# Patient Record
Sex: MALE | Race: BLACK OR AFRICAN AMERICAN | Employment: OTHER | URBAN - METROPOLITAN AREA
[De-identification: names, ages, dates, MRNs, and addresses within clinical notes are randomized per-mention and may not be internally consistent; named-entity substitution may affect disease eponyms.]

---

## 2023-08-04 ENCOUNTER — OFFICE VISIT (OUTPATIENT)
Dept: FAMILY MEDICINE CLINIC | Facility: CLINIC | Age: 63
End: 2023-08-04
Payer: MEDICAID

## 2023-08-04 VITALS
SYSTOLIC BLOOD PRESSURE: 130 MMHG | BODY MASS INDEX: 26.07 KG/M2 | DIASTOLIC BLOOD PRESSURE: 68 MMHG | RESPIRATION RATE: 16 BRPM | HEART RATE: 78 BPM | OXYGEN SATURATION: 99 % | HEIGHT: 68 IN | WEIGHT: 172 LBS

## 2023-08-04 DIAGNOSIS — G89.29 CHRONIC PAIN OF BOTH SHOULDERS: ICD-10-CM

## 2023-08-04 DIAGNOSIS — Z00.00 ANNUAL PHYSICAL EXAM: Primary | ICD-10-CM

## 2023-08-04 DIAGNOSIS — I10 PRIMARY HYPERTENSION: ICD-10-CM

## 2023-08-04 DIAGNOSIS — M25.511 CHRONIC PAIN OF BOTH SHOULDERS: ICD-10-CM

## 2023-08-04 DIAGNOSIS — R07.89 ATYPICAL CHEST PAIN: ICD-10-CM

## 2023-08-04 DIAGNOSIS — M25.512 CHRONIC PAIN OF BOTH SHOULDERS: ICD-10-CM

## 2023-08-04 RX ORDER — HYDROCHLOROTHIAZIDE 25 MG/1
TABLET ORAL
COMMUNITY
End: 2023-08-04

## 2023-08-04 RX ORDER — LISINOPRIL AND HYDROCHLOROTHIAZIDE 12.5; 1 MG/1; MG/1
1 TABLET ORAL DAILY
Qty: 90 TABLET | Refills: 3 | Status: SHIPPED | OUTPATIENT
Start: 2023-08-04 | End: 2024-07-29

## 2023-08-12 ENCOUNTER — LABORATORY ENCOUNTER (OUTPATIENT)
Dept: LAB | Age: 63
End: 2023-08-12
Attending: FAMILY MEDICINE
Payer: MEDICAID

## 2023-08-12 ENCOUNTER — EKG ENCOUNTER (OUTPATIENT)
Dept: LAB | Age: 63
End: 2023-08-12
Attending: FAMILY MEDICINE
Payer: MEDICAID

## 2023-08-12 ENCOUNTER — HOSPITAL ENCOUNTER (OUTPATIENT)
Dept: GENERAL RADIOLOGY | Age: 63
Discharge: HOME | End: 2023-08-12
Attending: FAMILY MEDICINE
Payer: MEDICAID

## 2023-08-12 DIAGNOSIS — R07.89 ATYPICAL CHEST PAIN: ICD-10-CM

## 2023-08-12 DIAGNOSIS — Z00.00 ANNUAL PHYSICAL EXAM: ICD-10-CM

## 2023-08-12 LAB
ALBUMIN SERPL-MCNC: 4.2 G/DL (ref 3.4–5)
ALBUMIN/GLOB SERPL: 1.2 {RATIO} (ref 1–2)
ALP LIVER SERPL-CCNC: 68 U/L
ALT SERPL-CCNC: 25 U/L
ANION GAP SERPL CALC-SCNC: 6 MMOL/L (ref 0–18)
AST SERPL-CCNC: 26 U/L (ref 15–37)
BASOPHILS # BLD AUTO: 0.05 X10(3) UL (ref 0–0.2)
BASOPHILS NFR BLD AUTO: 0.8 %
BILIRUB SERPL-MCNC: 1.1 MG/DL (ref 0.1–2)
BUN BLD-MCNC: 18 MG/DL (ref 7–18)
CALCIUM BLD-MCNC: 9.7 MG/DL (ref 8.5–10.1)
CHLORIDE SERPL-SCNC: 99 MMOL/L (ref 98–112)
CHOLEST SERPL-MCNC: 167 MG/DL (ref ?–200)
CO2 SERPL-SCNC: 29 MMOL/L (ref 21–32)
COMPLEXED PSA SERPL-MCNC: 3.4 NG/ML (ref ?–4)
CREAT BLD-MCNC: 1.21 MG/DL
EGFRCR SERPLBLD CKD-EPI 2021: 67 ML/MIN/1.73M2 (ref 60–?)
EOSINOPHIL # BLD AUTO: 0.98 X10(3) UL (ref 0–0.7)
EOSINOPHIL NFR BLD AUTO: 14.8 %
ERYTHROCYTE [DISTWIDTH] IN BLOOD BY AUTOMATED COUNT: 13.2 %
FASTING PATIENT LIPID ANSWER: YES
FASTING STATUS PATIENT QL REPORTED: YES
GLOBULIN PLAS-MCNC: 3.6 G/DL (ref 2.8–4.4)
GLUCOSE BLD-MCNC: 111 MG/DL (ref 70–99)
HCT VFR BLD AUTO: 45.2 %
HDLC SERPL-MCNC: 55 MG/DL (ref 40–59)
HGB BLD-MCNC: 15.2 G/DL
IMM GRANULOCYTES # BLD AUTO: 0.01 X10(3) UL (ref 0–1)
IMM GRANULOCYTES NFR BLD: 0.2 %
LDLC SERPL CALC-MCNC: 99 MG/DL (ref ?–100)
LYMPHOCYTES # BLD AUTO: 2.34 X10(3) UL (ref 1–4)
LYMPHOCYTES NFR BLD AUTO: 35.3 %
MCH RBC QN AUTO: 29.4 PG (ref 26–34)
MCHC RBC AUTO-ENTMCNC: 33.6 G/DL (ref 31–37)
MCV RBC AUTO: 87.4 FL
MONOCYTES # BLD AUTO: 0.81 X10(3) UL (ref 0.1–1)
MONOCYTES NFR BLD AUTO: 12.2 %
NEUTROPHILS # BLD AUTO: 2.43 X10 (3) UL (ref 1.5–7.7)
NEUTROPHILS # BLD AUTO: 2.43 X10(3) UL (ref 1.5–7.7)
NEUTROPHILS NFR BLD AUTO: 36.7 %
NONHDLC SERPL-MCNC: 112 MG/DL (ref ?–130)
OSMOLALITY SERPL CALC.SUM OF ELEC: 281 MOSM/KG (ref 275–295)
PLATELET # BLD AUTO: 202 10(3)UL (ref 150–450)
POTASSIUM SERPL-SCNC: 3.4 MMOL/L (ref 3.5–5.1)
PROT SERPL-MCNC: 7.8 G/DL (ref 6.4–8.2)
RBC # BLD AUTO: 5.17 X10(6)UL
SODIUM SERPL-SCNC: 134 MMOL/L (ref 136–145)
TRIGL SERPL-MCNC: 67 MG/DL (ref 30–149)
TSI SER-ACNC: 1.9 MIU/ML (ref 0.36–3.74)
VIT D+METAB SERPL-MCNC: 26.2 NG/ML (ref 30–100)
VLDLC SERPL CALC-MCNC: 11 MG/DL (ref 0–30)
WBC # BLD AUTO: 6.6 X10(3) UL (ref 4–11)

## 2023-08-12 PROCEDURE — 36415 COLL VENOUS BLD VENIPUNCTURE: CPT

## 2023-08-12 PROCEDURE — 85025 COMPLETE CBC W/AUTO DIFF WBC: CPT

## 2023-08-12 PROCEDURE — 80061 LIPID PANEL: CPT

## 2023-08-12 PROCEDURE — 93005 ELECTROCARDIOGRAM TRACING: CPT

## 2023-08-12 PROCEDURE — 71046 X-RAY EXAM CHEST 2 VIEWS: CPT | Performed by: FAMILY MEDICINE

## 2023-08-12 PROCEDURE — 80053 COMPREHEN METABOLIC PANEL: CPT

## 2023-08-12 PROCEDURE — 82306 VITAMIN D 25 HYDROXY: CPT

## 2023-08-12 PROCEDURE — 93010 ELECTROCARDIOGRAM REPORT: CPT | Performed by: INTERNAL MEDICINE

## 2023-08-12 PROCEDURE — 84443 ASSAY THYROID STIM HORMONE: CPT

## 2023-08-13 LAB
ATRIAL RATE: 92 BPM
P AXIS: 45 DEGREES
P-R INTERVAL: 146 MS
Q-T INTERVAL: 366 MS
QRS DURATION: 82 MS
QTC CALCULATION (BEZET): 452 MS
R AXIS: 14 DEGREES
T AXIS: -1 DEGREES
VENTRICULAR RATE: 92 BPM

## 2023-10-25 ENCOUNTER — OFFICE VISIT (OUTPATIENT)
Dept: FAMILY MEDICINE CLINIC | Facility: CLINIC | Age: 63
End: 2023-10-25

## 2023-10-25 VITALS
OXYGEN SATURATION: 98 % | SYSTOLIC BLOOD PRESSURE: 142 MMHG | TEMPERATURE: 97 F | BODY MASS INDEX: 25.52 KG/M2 | HEART RATE: 86 BPM | HEIGHT: 68 IN | RESPIRATION RATE: 16 BRPM | WEIGHT: 168.38 LBS | DIASTOLIC BLOOD PRESSURE: 80 MMHG

## 2023-10-25 DIAGNOSIS — G89.29 CHRONIC MIDLINE THORACIC BACK PAIN: ICD-10-CM

## 2023-10-25 DIAGNOSIS — M54.6 CHRONIC MIDLINE THORACIC BACK PAIN: ICD-10-CM

## 2023-10-25 DIAGNOSIS — E87.6 HYPOKALEMIA: ICD-10-CM

## 2023-10-25 DIAGNOSIS — R05.3 CHRONIC COUGH: Primary | ICD-10-CM

## 2023-10-25 DIAGNOSIS — I10 PRIMARY HYPERTENSION: ICD-10-CM

## 2023-10-25 DIAGNOSIS — M94.0 COSTOCHONDRITIS: ICD-10-CM

## 2023-10-25 DIAGNOSIS — E87.1 HYPONATREMIA: ICD-10-CM

## 2023-10-25 DIAGNOSIS — K21.9 GASTROESOPHAGEAL REFLUX DISEASE, UNSPECIFIED WHETHER ESOPHAGITIS PRESENT: ICD-10-CM

## 2023-10-25 PROCEDURE — 3079F DIAST BP 80-89 MM HG: CPT | Performed by: STUDENT IN AN ORGANIZED HEALTH CARE EDUCATION/TRAINING PROGRAM

## 2023-10-25 PROCEDURE — 3008F BODY MASS INDEX DOCD: CPT | Performed by: STUDENT IN AN ORGANIZED HEALTH CARE EDUCATION/TRAINING PROGRAM

## 2023-10-25 PROCEDURE — 99214 OFFICE O/P EST MOD 30 MIN: CPT | Performed by: STUDENT IN AN ORGANIZED HEALTH CARE EDUCATION/TRAINING PROGRAM

## 2023-10-25 PROCEDURE — 3077F SYST BP >= 140 MM HG: CPT | Performed by: STUDENT IN AN ORGANIZED HEALTH CARE EDUCATION/TRAINING PROGRAM

## 2023-10-25 RX ORDER — LISINOPRIL AND HYDROCHLOROTHIAZIDE 12.5; 1 MG/1; MG/1
1 TABLET ORAL DAILY
Qty: 90 TABLET | Refills: 0 | Status: SHIPPED | OUTPATIENT
Start: 2023-10-25

## 2023-10-25 RX ORDER — POTASSIUM CHLORIDE 750 MG/1
10 TABLET, FILM COATED, EXTENDED RELEASE ORAL DAILY
Qty: 90 TABLET | Refills: 0 | Status: SHIPPED | OUTPATIENT
Start: 2023-10-25

## 2023-10-25 RX ORDER — NAPROXEN 500 MG/1
500 TABLET ORAL 2 TIMES DAILY PRN
Qty: 30 TABLET | Refills: 0 | Status: SHIPPED | OUTPATIENT
Start: 2023-10-25 | End: 2023-11-04

## 2023-10-25 RX ORDER — PANTOPRAZOLE SODIUM 40 MG/1
40 TABLET, DELAYED RELEASE ORAL
Qty: 60 TABLET | Refills: 0 | Status: SHIPPED | OUTPATIENT
Start: 2023-10-25 | End: 2023-12-24

## 2023-11-30 ENCOUNTER — PATIENT MESSAGE (OUTPATIENT)
Dept: FAMILY MEDICINE CLINIC | Facility: CLINIC | Age: 63
End: 2023-11-30

## 2023-11-30 DIAGNOSIS — Z71.84 TRAVEL ADVICE ENCOUNTER: Primary | ICD-10-CM

## 2023-11-30 NOTE — TELEPHONE ENCOUNTER
From: Sonny Barrera  To: Angelo Artist  Sent: 11/30/2023 10:25 AM CST  Subject: Yellow fever and typhoid vaccines     Good morning Dr. Yahaira Lozada. I'm traveling to the [place] on December 18 2023. I've been advised to take yellow fever and typhoid vaccines. However, a nurse at a [facility] told me that my medical insurance requires doctor authorization in order to get the vaccines. Can please help with this. The [facility] address is [address] [place] [address]. Please let me know as soon as possible. Thank you.    Bethany Tejeda

## 2023-12-01 ENCOUNTER — HOSPITAL ENCOUNTER (OUTPATIENT)
Dept: GENERAL RADIOLOGY | Age: 63
Discharge: HOME | End: 2023-12-01
Attending: STUDENT IN AN ORGANIZED HEALTH CARE EDUCATION/TRAINING PROGRAM
Payer: MEDICAID

## 2023-12-01 ENCOUNTER — LAB ENCOUNTER (OUTPATIENT)
Dept: LAB | Age: 63
End: 2023-12-01
Attending: STUDENT IN AN ORGANIZED HEALTH CARE EDUCATION/TRAINING PROGRAM
Payer: MEDICAID

## 2023-12-01 DIAGNOSIS — E87.6 HYPOKALEMIA: ICD-10-CM

## 2023-12-01 DIAGNOSIS — G89.29 CHRONIC MIDLINE THORACIC BACK PAIN: ICD-10-CM

## 2023-12-01 DIAGNOSIS — M54.6 CHRONIC MIDLINE THORACIC BACK PAIN: ICD-10-CM

## 2023-12-01 DIAGNOSIS — E87.1 HYPONATREMIA: ICD-10-CM

## 2023-12-01 LAB
ANION GAP SERPL CALC-SCNC: 3 MMOL/L (ref 0–18)
BUN BLD-MCNC: 16 MG/DL (ref 9–23)
CALCIUM BLD-MCNC: 9.5 MG/DL (ref 8.5–10.1)
CHLORIDE SERPL-SCNC: 103 MMOL/L (ref 98–112)
CO2 SERPL-SCNC: 31 MMOL/L (ref 21–32)
CREAT BLD-MCNC: 1.35 MG/DL
EGFRCR SERPLBLD CKD-EPI 2021: 59 ML/MIN/1.73M2 (ref 60–?)
FASTING STATUS PATIENT QL REPORTED: NO
GLUCOSE BLD-MCNC: 95 MG/DL (ref 70–99)
OSMOLALITY SERPL CALC.SUM OF ELEC: 285 MOSM/KG (ref 275–295)
POTASSIUM SERPL-SCNC: 3.5 MMOL/L (ref 3.5–5.1)
SODIUM SERPL-SCNC: 137 MMOL/L (ref 136–145)

## 2023-12-01 PROCEDURE — 72072 X-RAY EXAM THORAC SPINE 3VWS: CPT | Performed by: STUDENT IN AN ORGANIZED HEALTH CARE EDUCATION/TRAINING PROGRAM

## 2023-12-01 PROCEDURE — 72040 X-RAY EXAM NECK SPINE 2-3 VW: CPT | Performed by: STUDENT IN AN ORGANIZED HEALTH CARE EDUCATION/TRAINING PROGRAM

## 2023-12-01 PROCEDURE — 36415 COLL VENOUS BLD VENIPUNCTURE: CPT

## 2023-12-01 PROCEDURE — 80048 BASIC METABOLIC PNL TOTAL CA: CPT

## 2023-12-04 ENCOUNTER — PATIENT MESSAGE (OUTPATIENT)
Dept: FAMILY MEDICINE CLINIC | Facility: CLINIC | Age: 63
End: 2023-12-04

## 2023-12-04 DIAGNOSIS — I10 PRIMARY HYPERTENSION: ICD-10-CM

## 2023-12-04 DIAGNOSIS — G89.29 CHRONIC MIDLINE THORACIC BACK PAIN: ICD-10-CM

## 2023-12-04 DIAGNOSIS — M54.6 CHRONIC MIDLINE THORACIC BACK PAIN: ICD-10-CM

## 2023-12-04 DIAGNOSIS — M94.0 COSTOCHONDRITIS: ICD-10-CM

## 2023-12-04 DIAGNOSIS — E87.6 HYPOKALEMIA: ICD-10-CM

## 2023-12-04 NOTE — PROGRESS NOTES
Cervical and thoracic spine XR showed cervical DDD/DJD and thoracic DDD.  Naproxen and PT recommended during LOV

## 2023-12-08 NOTE — TELEPHONE ENCOUNTER
From: Warren President  To: Vern Cowan  Sent: 12/4/2023 10:27 PM CST  Subject: Test Results/Medications    Yong Ferguson,  I need more of my medicines since I'll be away for about six months, returning on May 29, 2024. Can you please help me with this?      Thanks   [facility]

## 2023-12-11 RX ORDER — NAPROXEN 500 MG/1
500 TABLET ORAL 2 TIMES DAILY PRN
Qty: 90 TABLET | Refills: 0 | Status: SHIPPED | OUTPATIENT
Start: 2023-12-11 | End: 2023-12-21

## 2023-12-11 RX ORDER — POTASSIUM CHLORIDE 750 MG/1
10 TABLET, FILM COATED, EXTENDED RELEASE ORAL DAILY
Qty: 90 TABLET | Refills: 1 | Status: SHIPPED | OUTPATIENT
Start: 2023-12-11

## 2023-12-11 RX ORDER — LISINOPRIL AND HYDROCHLOROTHIAZIDE 12.5; 1 MG/1; MG/1
1 TABLET ORAL DAILY
Qty: 90 TABLET | Refills: 1 | Status: SHIPPED | OUTPATIENT
Start: 2023-12-11

## 2023-12-16 DIAGNOSIS — M54.6 CHRONIC MIDLINE THORACIC BACK PAIN: ICD-10-CM

## 2023-12-16 DIAGNOSIS — G89.29 CHRONIC MIDLINE THORACIC BACK PAIN: ICD-10-CM

## 2023-12-16 DIAGNOSIS — M94.0 COSTOCHONDRITIS: ICD-10-CM

## 2023-12-18 RX ORDER — NAPROXEN 500 MG/1
TABLET ORAL
Qty: 90 TABLET | Refills: 0 | Status: SHIPPED | OUTPATIENT
Start: 2023-12-18

## 2023-12-18 NOTE — TELEPHONE ENCOUNTER
Requested Renewals     Name from pharmacy: NAPROXEN 500MG TABLETS         Will file in chart as: NAPROXEN 500 MG Oral Tab     Possible duplicate: Hover to review recent actions on this medication    Sig: TAKE 1 TABLET(500 MG) BY MOUTH TWICE DAILY AS NEEDED. DO NOT TAKE FOR MORE THAN 14 DAYS PER MONTH    Disp: 90 tablet    Refills: 0 (Pharmacy requested: Not specified)    Start: 12/16/2023    Class: Normal    Non-formulary For: Chronic midline thoracic back pain, Costochondritis        No future appointments. LOV: 10/25/23  RTC: 2 months  Last refill given on 12/11/23  BMP done 12/1/23    -medication pended for review.

## 2024-07-01 ENCOUNTER — OFFICE VISIT (OUTPATIENT)
Dept: FAMILY MEDICINE CLINIC | Facility: CLINIC | Age: 64
End: 2024-07-01

## 2024-07-01 VITALS
HEIGHT: 68 IN | DIASTOLIC BLOOD PRESSURE: 70 MMHG | OXYGEN SATURATION: 98 % | TEMPERATURE: 98 F | BODY MASS INDEX: 25.5 KG/M2 | HEART RATE: 84 BPM | WEIGHT: 168.25 LBS | RESPIRATION RATE: 16 BRPM | SYSTOLIC BLOOD PRESSURE: 144 MMHG

## 2024-07-01 DIAGNOSIS — R05.3 CHRONIC COUGH: Primary | ICD-10-CM

## 2024-07-01 DIAGNOSIS — M67.912 TENDINOPATHY OF LEFT ROTATOR CUFF: ICD-10-CM

## 2024-07-01 DIAGNOSIS — R79.89 ELEVATED SERUM CREATININE: ICD-10-CM

## 2024-07-01 DIAGNOSIS — R94.4 DECREASED GFR: ICD-10-CM

## 2024-07-01 DIAGNOSIS — K21.9 GASTROESOPHAGEAL REFLUX DISEASE, UNSPECIFIED WHETHER ESOPHAGITIS PRESENT: ICD-10-CM

## 2024-07-01 PROCEDURE — 99214 OFFICE O/P EST MOD 30 MIN: CPT | Performed by: STUDENT IN AN ORGANIZED HEALTH CARE EDUCATION/TRAINING PROGRAM

## 2024-07-01 RX ORDER — NAPROXEN 500 MG/1
TABLET ORAL
Qty: 30 TABLET | Refills: 0 | Status: SHIPPED | OUTPATIENT
Start: 2024-07-01

## 2024-07-01 RX ORDER — PANTOPRAZOLE SODIUM 40 MG/1
40 TABLET, DELAYED RELEASE ORAL
Qty: 90 TABLET | Refills: 1 | Status: SHIPPED | OUTPATIENT
Start: 2024-07-01

## 2024-07-01 NOTE — PROGRESS NOTES
Subjective:      Chief Complaint   Patient presents with    Cough     States its been for over a year - denies any phlegm - denies fever    Shoulder Pain     Left shoulder - states pain is mostly at night       HISTORY OF PRESENT ILLNESS  HPI  HPI obtained per patient report.  Sourav Daniels is a pleasant 64 year old male presenting for follow-up. His son provides translation for him today.   He still has the dry cough that we discussed during his LOV. He took a 2 month course of pantoprazole with some improvement.   He also notes chronic L lateral shoulder pain for 1 year. He does not recall a particular preceding injury. He denies associated numbness/tingling/weakness. The pain is exacerbated by moving his arm and sleeping overnight.     PAST PATIENT HISTORY  Past Medical History:    Essential hypertension    Vitamin D deficiency     Past Surgical History:   Procedure Laterality Date    Other surgical history      Prostate       CURRENT MEDICATIONS  Outpatient Medications Marked as Taking for the 7/1/24 encounter (Office Visit) with Angelina Valle MD   Medication Sig Dispense Refill    pantoprazole 40 MG Oral Tab EC Take 1 tablet (40 mg total) by mouth every morning before breakfast. 90 tablet 1    naproxen 500 MG Oral Tab TAKE 1 TABLET(500 MG) BY MOUTH TWICE DAILY AS NEEDED. DO NOT TAKE FOR MORE THAN 14 DAYS PER MONTH 30 tablet 0    lisinopril-hydroCHLOROthiazide 10-12.5 MG Oral Tab Take 1 tablet by mouth daily. 90 tablet 1       HEALTH MAINTENANCE  Immunization History   Administered Date(s) Administered    FLUZONE 6 months and older PFS 0.5 ml (27847) 12/12/2023    Hep A, Adult 12/12/2023    Meningococcal-Menveo 2month-55yr 12/12/2023    Typhoid,VI Polysacharide IM 12/12/2023       ALLERGIES AND DRUG REACTIONS  No Known Allergies    No family history on file.  Social History     Socioeconomic History    Marital status:    Tobacco Use    Smoking status: Former    Smokeless tobacco: Never    Tobacco comments:      Quit smoking more than 30 years ago.   Vaping Use    Vaping status: Never Used   Substance and Sexual Activity    Alcohol use: Never    Drug use: Never   Other Topics Concern    Caffeine Concern No    Exercise No    Seat Belt No    Special Diet No    Stress Concern No    Weight Concern No       Review of Systems   Respiratory:  Positive for cough.    Musculoskeletal:  Positive for arthralgias.   All other systems reviewed and are negative.         Objective:      /70   Pulse 84   Temp 97.7 °F (36.5 °C) (Temporal)   Resp 16   Ht 5' 8\" (1.727 m)   Wt 168 lb 4 oz (76.3 kg)   SpO2 98%   BMI 25.58 kg/m²   Body mass index is 25.58 kg/m².    Physical Exam  Vitals reviewed.   Constitutional:       General: He is not in acute distress.     Appearance: He is not ill-appearing, toxic-appearing or diaphoretic.   HENT:      Head: Normocephalic and atraumatic.   Eyes:      General: No scleral icterus.        Right eye: No discharge.         Left eye: No discharge.      Conjunctiva/sclera: Conjunctivae normal.   Cardiovascular:      Rate and Rhythm: Normal rate.   Pulmonary:      Effort: Pulmonary effort is normal.   Abdominal:      General: Abdomen is flat. There is no distension.   Musculoskeletal:         General: No swelling, tenderness or deformity. Normal range of motion.      Cervical back: Neck supple.      Comments: L shoulder:   No erythema/edema/tenderness  No deformities  ROM WNL  Sulcus sign negative  Empty can test positive  Neer's sign positive  Hawkin's sign positive  Internal rotation test positive  External rotation test and speed's test negative   Skin:     General: Skin is warm and dry.      Coloration: Skin is not jaundiced or pale.      Findings: No bruising, erythema or rash.   Neurological:      Mental Status: He is alert and oriented to person, place, and time.   Psychiatric:         Mood and Affect: Mood normal.            Assessment and Plan:      1. Chronic cough (Primary)  -      Pantoprazole Sodium; Take 1 tablet (40 mg total) by mouth every morning before breakfast.  Dispense: 90 tablet; Refill: 1  2. Gastroesophageal reflux disease, unspecified whether esophagitis present  -     Pantoprazole Sodium; Take 1 tablet (40 mg total) by mouth every morning before breakfast.  Dispense: 90 tablet; Refill: 1  3. Tendinopathy of left rotator cuff  -     Naproxen; TAKE 1 TABLET(500 MG) BY MOUTH TWICE DAILY AS NEEDED. DO NOT TAKE FOR MORE THAN 14 DAYS PER MONTH  Dispense: 30 tablet; Refill: 0  -     OP REFERRAL TO [facility]  4. Elevated serum creatinine  -     Basic Metabolic Panel (8); Future; Expected date: 07/01/2024  5. Decreased GFR  -     Basic Metabolic Panel (8); Future; Expected date: 07/01/2024    Return in about 3 months (around 10/1/2024) for follow-up.    Cough   - likely 2/2 GERD  - improved some when he was taking pantoprazole  - will extend course of pantoprazole daily  - if cough does not resolve with extension of course of pantoprazole for another 2-3 months, recommend follow-up for further testing    L rotator cuff tendinopathy with impingement syndrome  - recommended a course of naproxen and PT  - if symptoms do not improve as expected with the above, we will obtain imaging and consider Ortho referral    Mildly decreased GFR and mildly elevated Cr  - BMP 12/1/23 showed mildly reduced renal function  - will recheck BMP    Patient verbalized understanding of assessment and recommendations. All questions and concerns were addressed.    Electronically signed by Angelina Valle MD

## 2024-08-08 DIAGNOSIS — I10 PRIMARY HYPERTENSION: ICD-10-CM

## 2024-08-09 RX ORDER — LISINOPRIL AND HYDROCHLOROTHIAZIDE 12.5; 1 MG/1; MG/1
1 TABLET ORAL DAILY
Qty: 30 TABLET | Refills: 0 | Status: SHIPPED | OUTPATIENT
Start: 2024-08-09

## 2024-09-07 DIAGNOSIS — I10 PRIMARY HYPERTENSION: ICD-10-CM

## 2024-09-11 RX ORDER — LISINOPRIL/HYDROCHLOROTHIAZIDE 10-12.5 MG
1 TABLET ORAL DAILY
Qty: 15 TABLET | Refills: 0 | Status: SHIPPED | OUTPATIENT
Start: 2024-09-11

## 2024-09-28 ENCOUNTER — LAB ENCOUNTER (OUTPATIENT)
Dept: LAB | Age: 64
End: 2024-09-28
Attending: STUDENT IN AN ORGANIZED HEALTH CARE EDUCATION/TRAINING PROGRAM

## 2024-09-28 DIAGNOSIS — R94.4 DECREASED GFR: ICD-10-CM

## 2024-09-28 DIAGNOSIS — R79.89 ELEVATED SERUM CREATININE: ICD-10-CM

## 2024-09-28 LAB
ANION GAP SERPL CALC-SCNC: 7 MMOL/L (ref 0–18)
BUN BLD-MCNC: 12 MG/DL (ref 9–23)
CALCIUM BLD-MCNC: 10.4 MG/DL (ref 8.7–10.4)
CHLORIDE SERPL-SCNC: 103 MMOL/L (ref 98–112)
CO2 SERPL-SCNC: 29 MMOL/L (ref 21–32)
CREAT BLD-MCNC: 1.23 MG/DL
EGFRCR SERPLBLD CKD-EPI 2021: 66 ML/MIN/1.73M2 (ref 60–?)
FASTING STATUS PATIENT QL REPORTED: YES
GLUCOSE BLD-MCNC: 103 MG/DL (ref 70–99)
OSMOLALITY SERPL CALC.SUM OF ELEC: 288 MOSM/KG (ref 275–295)
POTASSIUM SERPL-SCNC: 3.9 MMOL/L (ref 3.5–5.1)
SODIUM SERPL-SCNC: 139 MMOL/L (ref 136–145)

## 2024-09-28 PROCEDURE — 80048 BASIC METABOLIC PNL TOTAL CA: CPT

## 2024-09-28 PROCEDURE — 36415 COLL VENOUS BLD VENIPUNCTURE: CPT

## 2024-09-30 NOTE — PROGRESS NOTES
Hi Sourav,     Your follow-up BMP showed normalization of your kidney function markers, which is reassuring. Your previously abnormal levels were likely due to dehydration. Please continue to drink plenty of water throughout your day, and we will plan to monitor your levels annually.     Dr. Valle

## 2024-10-01 DIAGNOSIS — I10 PRIMARY HYPERTENSION: ICD-10-CM

## 2024-10-04 RX ORDER — LISINOPRIL/HYDROCHLOROTHIAZIDE 10-12.5 MG
1 TABLET ORAL DAILY
Qty: 15 TABLET | Refills: 0 | Status: SHIPPED | OUTPATIENT
Start: 2024-10-04

## 2024-10-05 DIAGNOSIS — I10 PRIMARY HYPERTENSION: ICD-10-CM

## 2024-10-07 RX ORDER — LISINOPRIL/HYDROCHLOROTHIAZIDE 10-12.5 MG
1 TABLET ORAL DAILY
Qty: 90 TABLET | Refills: 0 | OUTPATIENT
Start: 2024-10-07

## 2024-11-22 ENCOUNTER — HOSPITAL ENCOUNTER (EMERGENCY)
Age: 64
Discharge: HOME | End: 2024-11-22
Attending: EMERGENCY MEDICINE

## 2024-11-22 ENCOUNTER — APPOINTMENT (OUTPATIENT)
Dept: GENERAL RADIOLOGY | Age: 64
End: 2024-11-22
Attending: EMERGENCY MEDICINE

## 2024-11-22 VITALS
RESPIRATION RATE: 17 BRPM | HEIGHT: 68 IN | BODY MASS INDEX: 25.76 KG/M2 | OXYGEN SATURATION: 100 % | SYSTOLIC BLOOD PRESSURE: 157 MMHG | TEMPERATURE: 97 F | WEIGHT: 170 LBS | DIASTOLIC BLOOD PRESSURE: 92 MMHG | HEART RATE: 88 BPM

## 2024-11-22 DIAGNOSIS — M12.812 ROTATOR CUFF ARTHROPATHY OF LEFT SHOULDER: Primary | ICD-10-CM

## 2024-11-22 PROCEDURE — 73030 X-RAY EXAM OF SHOULDER: CPT | Performed by: EMERGENCY MEDICINE

## 2024-11-22 PROCEDURE — 99283 EMERGENCY DEPT VISIT LOW MDM: CPT

## 2024-11-22 RX ORDER — IBUPROFEN 400 MG/1
400 TABLET, FILM COATED ORAL ONCE
Status: COMPLETED | OUTPATIENT
Start: 2024-11-22 | End: 2024-11-22

## 2024-11-22 RX ORDER — KETOROLAC TROMETHAMINE 15 MG/ML
15 INJECTION, SOLUTION INTRAMUSCULAR; INTRAVENOUS ONCE
Status: DISCONTINUED | OUTPATIENT
Start: 2024-11-22 | End: 2024-11-22

## 2024-11-22 NOTE — ED INITIAL ASSESSMENT (HPI)
Pt reports coming in due to left shoulder discomfort. Reports this has been going on for years but reports the pain was worse last night. Did not take anything for pain.

## 2024-11-22 NOTE — DISCHARGE INSTRUCTIONS
Follow-up with orthopedics for further evaluation  Ibuprofen 400 mg 3 times a day with food, alternate with Tylenol 500 mg twice daily for 5 days  Warm compresses topically  Lidocaine patches topically which she can buy over-the-counter  Range of motion as tolerated  Return for any problems

## 2024-11-22 NOTE — ED PROVIDER NOTES
Patient Seen in: [facility] [place]      History     Chief Complaint   Patient presents with    Shoulder Pain     Stated Complaint: left shoulder pain - worse at night - several weeks    Subjective:   HPI      This is a 64-year-old male who presents with left shoulder pain.  He has had this shoulder pain for about a year.  He also has some intermittent left leg pain.  That is better currently.  He saw his primary care physician back in August who ran some blood test and did a chest x-ray which was normal.  He has not taken anything for the pain today.  He rates it as a 5/10.  The pain is mainly in his left shoulder and exacerbated with movement above his head.  No falls or trauma.  No fevers.  No recent travel.  He presents here for further evaluation.    Objective:     Past Medical History:    Essential hypertension    Vitamin D deficiency              Past Surgical History:   Procedure Laterality Date    Other surgical history      Prostate                Social History     Socioeconomic History    Marital status:    Tobacco Use    Smoking status: Former    Smokeless tobacco: Never    Tobacco comments:     Quit smoking more than 30 years ago.   Vaping Use    Vaping status: Never Used   Substance and Sexual Activity    Alcohol use: Never    Drug use: Never   Other Topics Concern    Caffeine Concern No    Exercise No    Seat Belt No    Special Diet No    Stress Concern No    Weight Concern No                  Physical Exam     ED Triage Vitals [11/22/24 0958]   BP (!) 157/92   Pulse 88   Resp 17   Temp 97.4 °F (36.3 °C)   Temp src    SpO2 100 %   O2 Device None (Room air)       Current Vitals:   Vital Signs  BP: (!) 157/92  Pulse: 88  Resp: 17  Temp: 97.4 °F (36.3 °C)    Oxygen Therapy  SpO2: 100 %  O2 Device: None (Room air)        Physical Exam  GENERAL: Awake, alert oriented x3, nontoxic appearing.   SKIN: Normal, warm, and dry.  HEENT:  Pupils equally round and reactive to light.  Conjuctiva clear.  Oropharynx is clear and moist.   Lungs: Clear to auscultation bilaterally with no rales, no retractions, and no wheezing.  HEART:  Regular rate and rhythm. S1 and S2. No murmurs, no rubs or gallops.   ABDOMEN: Soft, nontender and nondistended. Normoactive bowel sounds. No rebound. No guarding.   EXTREMITIES: Patient is tender over the anterior rotator cuff.  He has pain with full extension of his left arm above his head.  No gross deformity.  Warm with brisk capillary refill.         ED Course   Labs Reviewed - No data to display         XR SHOULDER, COMPLETE (MIN 2 VIEWS), LEFT (CPT=73030)    Result Date: 11/22/2024  PROCEDURE:  XR SHOULDER, COMPLETE (MIN 2 VIEWS), LEFT (CPT=73030)  TECHNIQUE:  Multiple views were obtained.  COMPARISON:  None.  INDICATIONS:  left shoulder pain - worse at night - several weeks  PATIENT STATED HISTORY: (As transcribed by Technologist)  Patient states anterior and posterior left shoulder pain X years. He states that the pain is worse at night. Patient denies any known injury.    FINDINGS:  There is no evidence of an acute process.  The left AC joint and glenohumeral joint reveal no significant arthropathy.  There are cortical erosive and subcortical cystic changes along the greater tuberosity suggesting underlying rotator cuff enthesopathy.  No soft tissue swelling noted.            CONCLUSION:  1. No acute process. 2. Radiographic findings of rotator cuff enthesopathy of the greater tuberosity insertion.  Please correlate clinically for rotator cuff disease.  If clinically indicated, this may be further assessed with MR imaging.   LOCATION:  [facility]   Dictated by (CST): Tim Root DO on 11/22/2024 at 11:31 AM     Finalized by (CST): Tim Root DO on 11/22/2024 at 11:32 AM            MDM      This is a 64-year-old male who presents with left shoulder pain.  He has had this shoulder pain for about a year.   Differential includes arthritis, rotator cuff  tendinitis.  Patient was given ibuprofen.        I independently read the left shoulder x-ray which shows no evidence of fracture.  There is some evidence of rotary rotator cuff enthesopathy mainly at the greater tuberosity insertion.  There is no evidence of fracture.  I reviewed the radiology interpretation and agreement.      Recommend ibuprofen alternating with Tylenol.  Warm compresses.  Lidocaine patches.  He should follow-up with orthopedics for further evaluation.  Right shoulder rotator cuff arthropathy.  Patient was discharged home in good condition.    Disposition and Plan     Clinical Impression:  1. Rotator cuff arthropathy of left shoulder         Disposition:  There is no disposition on file for this visit.  There is no disposition time on file for this visit.    Follow-up:  Kyler Shields MD  [address] [place] [address]  [address]  [facility] [place] [address] [phone]    Follow up on 11/25/2024            Medications Prescribed:  Current Discharge Medication List              Supplementary Documentation:

## 2024-12-16 ENCOUNTER — OFFICE VISIT (OUTPATIENT)
Dept: FAMILY MEDICINE CLINIC | Facility: CLINIC | Age: 64
End: 2024-12-16

## 2024-12-16 VITALS
BODY MASS INDEX: 26.95 KG/M2 | OXYGEN SATURATION: 98 % | WEIGHT: 177.81 LBS | HEIGHT: 68 IN | RESPIRATION RATE: 16 BRPM | DIASTOLIC BLOOD PRESSURE: 80 MMHG | TEMPERATURE: 98 F | SYSTOLIC BLOOD PRESSURE: 134 MMHG | HEART RATE: 100 BPM

## 2024-12-16 DIAGNOSIS — I10 PRIMARY HYPERTENSION: Primary | ICD-10-CM

## 2024-12-16 DIAGNOSIS — Z23 NEED FOR VACCINATION: ICD-10-CM

## 2024-12-16 PROCEDURE — 90471 IMMUNIZATION ADMIN: CPT | Performed by: STUDENT IN AN ORGANIZED HEALTH CARE EDUCATION/TRAINING PROGRAM

## 2024-12-16 PROCEDURE — 99213 OFFICE O/P EST LOW 20 MIN: CPT | Performed by: STUDENT IN AN ORGANIZED HEALTH CARE EDUCATION/TRAINING PROGRAM

## 2024-12-16 PROCEDURE — 90656 IIV3 VACC NO PRSV 0.5 ML IM: CPT | Performed by: STUDENT IN AN ORGANIZED HEALTH CARE EDUCATION/TRAINING PROGRAM

## 2024-12-16 RX ORDER — LISINOPRIL AND HYDROCHLOROTHIAZIDE 12.5; 2 MG/1; MG/1
1 TABLET ORAL DAILY
Qty: 30 TABLET | Refills: 0 | Status: CANCELLED | OUTPATIENT
Start: 2024-12-16

## 2024-12-16 RX ORDER — IBUPROFEN 200 MG
200 TABLET ORAL EVERY 6 HOURS PRN
COMMUNITY

## 2024-12-16 NOTE — PROGRESS NOTES
Subjective:      Chief Complaint   Patient presents with    Blood Pressure     High blood pressure for the past couple of weeks    Immunization/Injection     Flu vaccine today     HISTORY OF PRESENT ILLNESS  HPI  HPI obtained per patient report.  Sourav Daniels is a pleasant 64 year old male presenting due to elevated BP.   He checks his BP daily at home, and they have been elevated in the 150s/90s over the past several weeks. He is asymptomatic. He reports preceding discontinuation of exercise due to the cold weather, but his family recently installed a treadmill in the home for him to use over the winter. He does also note significant sodium intake.  He also requests his influenza vaccine administration today.    PAST PATIENT HISTORY  Past Medical History:    Essential hypertension    Vitamin D deficiency     Past Surgical History:   Procedure Laterality Date    Other surgical history      Prostate       CURRENT MEDICATIONS  Medications Taking[1]    HEALTH MAINTENANCE  Immunization History   Administered Date(s) Administered    FLUZONE 6 months and older PFS 0.5 ml (55391) 12/12/2023    Hep A, Adult 12/12/2023    Meningococcal-Menveo 2month-55yr 12/12/2023    Typhoid,VI Polysacharide IM 12/12/2023   Pended Date(s) Pended    Influenza Vaccine, trivalent (IIV3), PF 0.5mL (31508) 12/16/2024       ALLERGIES AND DRUG REACTIONS  Allergies[2]    No family history on file.  Social History     Socioeconomic History    Marital status:    Tobacco Use    Smoking status: Former    Smokeless tobacco: Never    Tobacco comments:     Quit smoking more than 30 years ago.   Vaping Use    Vaping status: Never Used   Substance and Sexual Activity    Alcohol use: Never    Drug use: Never   Other Topics Concern    Caffeine Concern No    Exercise No    Seat Belt No    Special Diet No    Stress Concern No    Weight Concern No       Review of Systems   All other systems reviewed and are negative.         Objective:      /80    Pulse 100   Temp 97.5 °F (36.4 °C) (Temporal)   Resp 16   Ht 5' 8\" (1.727 m)   Wt 177 lb 12.8 oz (80.6 kg)   SpO2 98%   BMI 27.03 kg/m²   Body mass index is 27.03 kg/m².    Physical Exam  Vitals reviewed.   Constitutional:       General: He is not in acute distress.     Appearance: He is not ill-appearing, toxic-appearing or diaphoretic.   HENT:      Head: Normocephalic and atraumatic.   Eyes:      General: No scleral icterus.        Right eye: No discharge.         Left eye: No discharge.      Extraocular Movements: Extraocular movements intact.      Conjunctiva/sclera: Conjunctivae normal.   Cardiovascular:      Rate and Rhythm: Normal rate and regular rhythm.      Heart sounds: Normal heart sounds.   Pulmonary:      Effort: Pulmonary effort is normal.      Breath sounds: Normal breath sounds.   Abdominal:      General: Abdomen is flat.   Musculoskeletal:      Cervical back: Neck supple.      Right lower leg: No edema.      Left lower leg: No edema.   Neurological:      Mental Status: He is alert and oriented to person, place, and time.            Assessment and Plan:      1. Primary hypertension (Primary)  2. Need for vaccination  -     INFLUENZA VACCINE, TRI, PRESERV FREE, 0.5 ML    No follow-ups on file.    HTN  - his BP's were previously well controlled with his current dose of lisinopril-hydrochlorothiazide  - recommended reducing his sodium intake and reincorporating daily exercise into his routine. After 1 week, if his BP still is not consistently <140/90, he was asked to let me know via a My Chart message or phone call. We discussed that we will need to adjust his antihypertensive medication dose in that case        Patient verbalized understanding of assessment and recommendations. All questions and concerns were addressed.    Electronically signed by Angelina Valle MD         [1]   Outpatient Medications Marked as Taking for the 12/16/24 encounter (Office Visit) with Angelina Valle MD   Medication Sig  Dispense Refill    ibuprofen (ADVIL) 200 MG Oral Tab Take 1 tablet (200 mg total) by mouth every 6 (six) hours as needed for Pain.      lisinopril-hydroCHLOROthiazide 10-12.5 MG Oral Tab Take 1 tablet by mouth daily. 15 tablet 0   [2] No Known Allergies

## 2024-12-27 DIAGNOSIS — I10 PRIMARY HYPERTENSION: ICD-10-CM

## 2024-12-27 RX ORDER — LISINOPRIL AND HYDROCHLOROTHIAZIDE 10; 12.5 MG/1; MG/1
1 TABLET ORAL DAILY
Qty: 90 TABLET | Refills: 0 | Status: SHIPPED | OUTPATIENT
Start: 2024-12-27 | End: 2025-03-31

## 2025-03-31 ENCOUNTER — TELEPHONE (OUTPATIENT)
Dept: FAMILY MEDICINE CLINIC | Facility: CLINIC | Age: 65
End: 2025-03-31

## 2025-03-31 DIAGNOSIS — I10 PRIMARY HYPERTENSION: ICD-10-CM

## 2025-03-31 RX ORDER — LISINOPRIL AND HYDROCHLOROTHIAZIDE 10; 12.5 MG/1; MG/1
1 TABLET ORAL DAILY
Qty: 90 TABLET | Refills: 0 | Status: SHIPPED | OUTPATIENT
Start: 2025-03-31

## 2025-03-31 NOTE — TELEPHONE ENCOUNTER
Requested Renewals     Name from pharmacy: LISINOPRIL-HCTZ 10/12.5MG TABLETS         Will file in chart as: LISINOPRIL-HYDROCHLOROTHIAZIDE 10-12.5 MG Oral Tab    Sig: TAKE 1 TABLET BY MOUTH DAILY    Disp: 90 tablet    Refills: 0 (Pharmacy requested: Not specified)    Start: 3/31/2025    Class: Normal    Non-formulary For: Primary hypertension    Last ordered: 3 months ago (12/27/2024) by Collette Saldana DO    Last refill: 10/28/2023    Rx #: [id number]    Hypertension Medications Protocol [id number]/31/2025 05:15 PM   Protocol Details CMP or BMP in past 12 months    Last BP reading less than 140/90    In person appointment or virtual visit in the past 12 mos or appointment in next 3 mos    EGFRCR or GFRAA > 50    Medication is active on med list             Future Appointments   Date Time Provider Department Center   4/11/2025  2:00 PM Angelina Valle MD [facility] [address]     RX sent as it passed protocol.

## 2025-03-31 NOTE — TELEPHONE ENCOUNTER
Appointment for: Sourav Daniels ([id number])   Visit type: MYCHART EXAM (2964)   4/11/2025 2:00 PM (30 minutes) with Angelina Valle in [facility] [address]      Patient comments:   Pain in the chest and back causing sleepless nights.

## 2025-04-11 ENCOUNTER — OFFICE VISIT (OUTPATIENT)
Dept: FAMILY MEDICINE CLINIC | Facility: CLINIC | Age: 65
End: 2025-04-11

## 2025-04-11 VITALS
BODY MASS INDEX: 25.18 KG/M2 | TEMPERATURE: 98 F | DIASTOLIC BLOOD PRESSURE: 70 MMHG | HEIGHT: 68 IN | OXYGEN SATURATION: 98 % | SYSTOLIC BLOOD PRESSURE: 128 MMHG | WEIGHT: 166.13 LBS | HEART RATE: 67 BPM | RESPIRATION RATE: 16 BRPM

## 2025-04-11 DIAGNOSIS — M54.6 CHRONIC MIDLINE THORACIC BACK PAIN: ICD-10-CM

## 2025-04-11 DIAGNOSIS — G89.29 CHRONIC MIDLINE THORACIC BACK PAIN: ICD-10-CM

## 2025-04-11 DIAGNOSIS — K21.9 GASTROESOPHAGEAL REFLUX DISEASE, UNSPECIFIED WHETHER ESOPHAGITIS PRESENT: ICD-10-CM

## 2025-04-11 DIAGNOSIS — I10 PRIMARY HYPERTENSION: ICD-10-CM

## 2025-04-11 DIAGNOSIS — M51.34 DDD (DEGENERATIVE DISC DISEASE), THORACIC: ICD-10-CM

## 2025-04-11 DIAGNOSIS — M94.0 COSTOCHONDRITIS: Primary | ICD-10-CM

## 2025-04-11 DIAGNOSIS — M50.30 DDD (DEGENERATIVE DISC DISEASE), CERVICAL: ICD-10-CM

## 2025-04-11 PROCEDURE — 99214 OFFICE O/P EST MOD 30 MIN: CPT | Performed by: STUDENT IN AN ORGANIZED HEALTH CARE EDUCATION/TRAINING PROGRAM

## 2025-04-11 RX ORDER — NAPROXEN 500 MG/1
500 TABLET ORAL 2 TIMES DAILY PRN
Qty: 30 TABLET | Refills: 0 | Status: CANCELLED | OUTPATIENT
Start: 2025-04-11

## 2025-04-11 RX ORDER — LISINOPRIL AND HYDROCHLOROTHIAZIDE 10; 12.5 MG/1; MG/1
1 TABLET ORAL DAILY
Qty: 90 TABLET | Refills: 1 | Status: SHIPPED | OUTPATIENT
Start: 2025-04-11

## 2025-04-11 RX ORDER — CYCLOBENZAPRINE HCL 5 MG
5 TABLET ORAL 3 TIMES DAILY PRN
Qty: 30 TABLET | Refills: 0 | Status: SHIPPED | OUTPATIENT
Start: 2025-04-11

## 2025-04-11 RX ORDER — PANTOPRAZOLE SODIUM 40 MG/1
40 TABLET, DELAYED RELEASE ORAL
Qty: 60 TABLET | Refills: 0 | Status: SHIPPED | OUTPATIENT
Start: 2025-04-11

## 2025-04-11 RX ORDER — LIDOCAINE 50 MG/G
1 PATCH TOPICAL DAILY PRN
Qty: 30 PATCH | Refills: 1 | Status: SHIPPED | OUTPATIENT
Start: 2025-04-11

## 2025-04-11 NOTE — PROGRESS NOTES
Subjective:      Chief Complaint   Patient presents with    Chest Pain     Pain radiates to his back - states its been for a long time - denies any SOB - states it comes and goes - states it causes him not to sleep well - states he experienced vomiting during Ramadan which he thinks it helped his chest pain - does feel burning sensation in chest     HISTORY OF PRESENT ILLNESS  HPI  HPI obtained per patient report.  Sourav Daniels is a pleasant 65 year old male presenting with chest pain, back pain, and heartburn.     He reports chronic CP and upper back pain for numerous years. Her CP is located at the midline anterior chest and radiates to his upper back. It occurs intermittently and can last for a few days. He has been taking ibuprofen which has been helping. No particular exacerbating factors.     He reports recurrence of heartburn while he was fasting during Ramadan. He had 1 episode of NBNB vomiting, which he states improved his heartburn symptom. This symptom is worse at night when trying to sleep.     He denies any other associated symptoms.       PAST PATIENT HISTORY  Past Medical History[1]  Past Surgical History[2]    CURRENT MEDICATIONS  Medications Taking[3]    HEALTH MAINTENANCE  Immunization History   Administered Date(s) Administered    FLUZONE 6 months and older PFS 0.5 ml (88849) 12/12/2023    Hep A, Adult 12/12/2023    Influenza Vaccine, trivalent (IIV3), PF 0.5mL (91910) 12/16/2024    Meningococcal-Menveo 2month-55yr 12/12/2023    Typhoid,VI Polysacharide IM 12/12/2023       ALLERGIES AND DRUG REACTIONS  Allergies[4]    Family History[5]  Short Social Hx on File[6]    Review of Systems   Cardiovascular:  Positive for chest pain.   Gastrointestinal:  Positive for vomiting.   Musculoskeletal:  Positive for back pain.   All other systems reviewed and are negative.         Objective:      /70   Pulse 67   Temp 97.5 °F (36.4 °C) (Temporal)   Resp 16   Ht 5' 8\" (1.727 m)   Wt 166 lb 2 oz (75.4  kg)   SpO2 98%   BMI 25.26 kg/m²   Body mass index is 25.26 kg/m².    Physical Exam  Vitals reviewed.   Constitutional:       General: He is not in acute distress.     Appearance: He is not ill-appearing, toxic-appearing or diaphoretic.   HENT:      Head: Normocephalic and atraumatic.   Eyes:      General: No scleral icterus.        Right eye: No discharge.         Left eye: No discharge.      Extraocular Movements: Extraocular movements intact.      Conjunctiva/sclera: Conjunctivae normal.   Cardiovascular:      Rate and Rhythm: Normal rate and regular rhythm.      Heart sounds: Normal heart sounds.   Pulmonary:      Effort: Pulmonary effort is normal.      Breath sounds: Normal breath sounds.   Chest:      Chest wall: Tenderness (mild tenderness along superior sternocostal joints) present.   Abdominal:      General: Abdomen is flat. There is no distension.      Palpations: Abdomen is soft. There is no mass.      Tenderness: There is no abdominal tenderness. There is no guarding or rebound.      Hernia: No hernia is present.   Musculoskeletal:         General: Tenderness (mild tenderness of superior thoracic midline spine) present. No swelling or deformity. Normal range of motion.      Cervical back: Neck supple.      Right lower leg: No edema.      Left lower leg: No edema.   Skin:     General: Skin is warm and dry.      Coloration: Skin is not jaundiced or pale.      Findings: No bruising, erythema or rash.   Neurological:      General: No focal deficit present.      Mental Status: He is alert and oriented to person, place, and time.   Psychiatric:         Mood and Affect: Mood normal.            Assessment and Plan:      1. Costochondritis (Primary)  -     OP REFERRAL TO [facility]  -     Cyclobenzaprine HCl; Take 1 tablet (5 mg total) by mouth 3 (three) times daily as needed for Muscle spasms.  Dispense: 30 tablet; Refill: 0  -     Lidocaine; Place 1 patch onto the skin daily as needed.   Dispense: 30 patch; Refill: 1  2. DDD (degenerative disc disease), cervical  -     OP REFERRAL TO [facility]  -     Cyclobenzaprine HCl; Take 1 tablet (5 mg total) by mouth 3 (three) times daily as needed for Muscle spasms.  Dispense: 30 tablet; Refill: 0  -     Lidocaine; Place 1 patch onto the skin daily as needed.  Dispense: 30 patch; Refill: 1  3. Chronic midline thoracic back pain  -     OP REFERRAL TO [facility]  -     Cyclobenzaprine HCl; Take 1 tablet (5 mg total) by mouth 3 (three) times daily as needed for Muscle spasms.  Dispense: 30 tablet; Refill: 0  -     Lidocaine; Place 1 patch onto the skin daily as needed.  Dispense: 30 patch; Refill: 1  4. DDD (degenerative disc disease), thoracic  -     OP REFERRAL TO [facility]  -     Cyclobenzaprine HCl; Take 1 tablet (5 mg total) by mouth 3 (three) times daily as needed for Muscle spasms.  Dispense: 30 tablet; Refill: 0  -     Lidocaine; Place 1 patch onto the skin daily as needed.  Dispense: 30 patch; Refill: 1  5. Gastroesophageal reflux disease, unspecified whether esophagitis present  -     Pantoprazole Sodium; Take 1 tablet (40 mg total) by mouth every morning before breakfast.  Dispense: 60 tablet; Refill: 0  6. Primary hypertension  -     Lisinopril-hydroCHLOROthiazide; Take 1 tablet by mouth daily.  Dispense: 90 tablet; Refill: 1    Return if symptoms worsen or fail to improve.    Costochondritis, cervical DDD/DJD, thoracic DDD  - cervical and thoracic spine x-rays 12/3/2023 showed cervical DDD/DJD and thoracic DDD  - continue ibuprofen as needed  - recommended flexeril and lidocaine patches as needed. Recommended against driving or operating machinery while taking flexeril  - recommended PT    GERD  - symptoms had resolved with a 2 month course of pantoprazole last year, but have recurred recently associated with fasting during Ramadan  - recommended another 60 day course of pantoprazole  as prescribed  - if symptoms recur again, we will need to consider long-term continuation of pantoprazole     HTN  - he requests refills for his lisinopril-hydrochlorothiazide  - his BP is well controlled  - his prescription renewals were sent for him     Patient verbalized understanding of assessment and recommendations. All questions and concerns were addressed.    Electronically signed by Angelina Valle MD         [1]   Past Medical History:   Essential hypertension    Vitamin D deficiency   [2]   Past Surgical History:  Procedure Laterality Date    Other surgical history      Prostate   [3]   Outpatient Medications Marked as Taking for the 4/11/25 encounter (Office Visit) with Angelina Valle MD   Medication Sig Dispense Refill    pantoprazole 40 MG Oral Tab EC Take 1 tablet (40 mg total) by mouth every morning before breakfast. 60 tablet 0    cyclobenzaprine 5 MG Oral Tab Take 1 tablet (5 mg total) by mouth 3 (three) times daily as needed for Muscle spasms. 30 tablet 0    lidocaine 5 % External Patch Place 1 patch onto the skin daily as needed. 30 patch 1    lisinopril-hydroCHLOROthiazide 10-12.5 MG Oral Tab Take 1 tablet by mouth daily. 90 tablet 1    ibuprofen (ADVIL) 200 MG Oral Tab Take 1 tablet (200 mg total) by mouth every 6 (six) hours as needed for Pain.     [4] No Known Allergies  [5] No family history on file.  [6]   Social History  Socioeconomic History    Marital status:    Tobacco Use    Smoking status: Former    Smokeless tobacco: Never    Tobacco comments:     Quit smoking more than 30 years ago.   Vaping Use    Vaping status: Never Used   Substance and Sexual Activity    Alcohol use: Never    Drug use: Never   Other Topics Concern    Caffeine Concern No    Exercise No    Seat Belt No    Special Diet No    Stress Concern No    Weight Concern No

## 2025-04-14 ENCOUNTER — TELEPHONE (OUTPATIENT)
Dept: PHYSICAL THERAPY | Age: 65
End: 2025-04-14

## 2025-07-09 ENCOUNTER — TELEPHONE (OUTPATIENT)
Dept: FAMILY MEDICINE CLINIC | Facility: CLINIC | Age: 65
End: 2025-07-09